# Patient Record
Sex: FEMALE | URBAN - METROPOLITAN AREA
[De-identification: names, ages, dates, MRNs, and addresses within clinical notes are randomized per-mention and may not be internally consistent; named-entity substitution may affect disease eponyms.]

---

## 2023-07-17 ENCOUNTER — APPOINTMENT (OUTPATIENT)
Dept: URBAN - METROPOLITAN AREA CLINIC 314 | Age: 28
Setting detail: DERMATOLOGY
End: 2023-07-17

## 2023-07-17 DIAGNOSIS — L81.4 OTHER MELANIN HYPERPIGMENTATION: ICD-10-CM

## 2023-07-17 DIAGNOSIS — D22 MELANOCYTIC NEVI: ICD-10-CM

## 2023-07-17 DIAGNOSIS — Z12.83 ENCOUNTER FOR SCREENING FOR MALIGNANT NEOPLASM OF SKIN: ICD-10-CM

## 2023-07-17 PROBLEM — D22.5 MELANOCYTIC NEVI OF TRUNK: Status: ACTIVE | Noted: 2023-07-17

## 2023-07-17 PROCEDURE — OTHER ADDITIONAL NOTES: OTHER

## 2023-07-17 PROCEDURE — OTHER MIPS QUALITY: OTHER

## 2023-07-17 PROCEDURE — 99203 OFFICE O/P NEW LOW 30 MIN: CPT

## 2023-07-17 PROCEDURE — OTHER COUNSELING: OTHER

## 2023-07-17 ASSESSMENT — LOCATION ZONE DERM: LOCATION ZONE: TRUNK

## 2023-07-17 ASSESSMENT — LOCATION DETAILED DESCRIPTION DERM
LOCATION DETAILED: EPIGASTRIC SKIN
LOCATION DETAILED: RIGHT SUPERIOR UPPER BACK
LOCATION DETAILED: RIGHT MEDIAL UPPER BACK
LOCATION DETAILED: LEFT MEDIAL SUPERIOR CHEST
LOCATION DETAILED: RIGHT MEDIAL SUPERIOR CHEST
LOCATION DETAILED: RIGHT INFERIOR MEDIAL UPPER BACK

## 2023-07-17 ASSESSMENT — LOCATION SIMPLE DESCRIPTION DERM
LOCATION SIMPLE: ABDOMEN
LOCATION SIMPLE: CHEST
LOCATION SIMPLE: RIGHT UPPER BACK

## 2023-07-17 NOTE — PROCEDURE: ADDITIONAL NOTES
Detail Level: Simple
Render Risk Assessment In Note?: no
Additional Notes: No FH of skin cancer. 0.4cm. Recommended patient to monitor for ABCDEs of melanoma and will check in a year